# Patient Record
Sex: MALE | Race: WHITE | NOT HISPANIC OR LATINO | URBAN - METROPOLITAN AREA
[De-identification: names, ages, dates, MRNs, and addresses within clinical notes are randomized per-mention and may not be internally consistent; named-entity substitution may affect disease eponyms.]

---

## 2022-03-11 ENCOUNTER — EMERGENCY (EMERGENCY)
Facility: HOSPITAL | Age: 52
LOS: 1 days | Discharge: HOME | End: 2022-03-11
Attending: EMERGENCY MEDICINE
Payer: COMMERCIAL

## 2022-03-11 VITALS
OXYGEN SATURATION: 96 % | SYSTOLIC BLOOD PRESSURE: 164 MMHG | HEART RATE: 93 BPM | RESPIRATION RATE: 18 BRPM | HEIGHT: 74 IN | TEMPERATURE: 99 F | DIASTOLIC BLOOD PRESSURE: 83 MMHG | WEIGHT: 225.09 LBS

## 2022-03-11 PROCEDURE — 99283 EMERGENCY DEPT VISIT LOW MDM: CPT | Mod: 25

## 2022-03-11 PROCEDURE — 73562 X-RAY EXAM OF KNEE 3: CPT | Mod: 26,LT

## 2022-03-11 PROCEDURE — 73562 X-RAY EXAM OF KNEE 3: CPT

## 2022-03-11 PROCEDURE — 99283 EMERGENCY DEPT VISIT LOW MDM: CPT

## 2022-03-11 RX ORDER — ACETAMINOPHEN 500 MG
975 TABLET ORAL ONCE
Refills: 0 | Status: COMPLETED | OUTPATIENT
Start: 2022-03-11 | End: 2022-03-11

## 2022-03-11 RX ORDER — IBUPROFEN 200 MG
600 TABLET ORAL ONCE
Refills: 0 | Status: COMPLETED | OUTPATIENT
Start: 2022-03-11 | End: 2022-03-11

## 2022-03-11 RX ADMIN — Medication 600 MILLIGRAM(S): at 21:27

## 2022-03-11 RX ADMIN — Medication 975 MILLIGRAM(S): at 21:26

## 2022-03-11 NOTE — ED PROVIDER NOTE - PHYSICAL EXAMINATION
NAD, VSS, Afebrile, Lungs clear. ABD soft, non tender. No CVA tender. No sinal or rib tender. No pelvic or hip tender. +Left knee anteriorly tender and mild diminished ROMs without obvious swelling. N/V- intact.

## 2022-03-11 NOTE — ED PROVIDER NOTE - CARE PROVIDER_API CALL
Mathew Mac (MD)  Orthopaedic Surgery  [address]  [place] [address]  Phone: [phone]  Fax: [phone]  Follow Up Time:

## 2022-03-11 NOTE — ED PROVIDER NOTE - PATIENT PORTAL LINK FT
You can access the FollowMyHealth Patient Portal offered by [facility] by registering at the following website: [email or web address]. By joining [facility]’s FollowMyHealth portal, you will also be able to view your health information using other applications (apps) compatible with our system.

## 2022-03-11 NOTE — ED PROVIDER NOTE - ATTENDING CONTRIBUTION TO CARE
I personally saw the patient and performed a substantive portion of the visit including all aspects of the medical decision making.

## 2022-03-11 NOTE — ED PROVIDER NOTE - SCRIBE NAME
Patient notified of providers message as written.  She has visit set up on 8/9/18, confirmed with patient that she will come to this visit and again reminded that no further refills until appointment. Patient verbalized understanding, no further questions or concerns.    Usha Bowers RN    
Please call patient: you have not shown up for your last appointments with me over the last year. I have refilled your bupropion and B12 medications. The rash requires an appointment to treat. No further refills until follow-up appointment. The other medications and supplements are available over-the-counter.     Shania Yeung MD   
Reason for Call:  Medication or medication refill:    Do you use a [facility]?  Name of the pharmacy and phone number for the current request:    [id number]  [place], [place] - [address]  [address]  [place] [address]  Phone: [phone] Fax: [phone]          Name of the medication requested: Bupropion, patient states she is out of all her medications, but the Bupropion is her priority and the most important    Other request: Patient is currently out of the medication and asking for this to be filled right away  Can we leave a detailed message on this number? YES    Phone number patient can be reached at: Home number on file [phone] (home)    Best Time: any time    Call taken on 7/26/2018 at 3:22 PM by Ayesha Fox      
Routing refill request to provider for review/approval because:  Patient needs to be seen because:  Over due for depression f/u  Last visit was on 9/25/18, patient was a No show on 1/23/18 and 3/21/18    Usha Bowers RN          
Sb Ferguson

## 2022-03-11 NOTE — ED PROVIDER NOTE - CLINICAL SUMMARY MEDICAL DECISION MAKING FREE TEXT BOX
João Lopez (MD): L knee injury most consistent with sprain. Plan for xr to rule out fracture, then likely EM with orthopedics follow up. João Lopez (MD): L knee injury most consistent with sprain from inversion injury, possible meniscal injury?. Plan for xr to rule out fracture, if negative knee immobilizer +/- crutches/cane and follow up with [facility] physicians and orthopedics as outpatient.

## 2022-03-11 NOTE — ED PROVIDER NOTE - OBJECTIVE STATEMENT
52 with PMHx/PSHx including WTC asthma on Symbicort x 1 week, HTN, GERD presents to the ED with left knee pain while in the line of duty. Patient was attempting to apprehend an individual. They were on the ground when the individual rolled over the patient's left knee with their full body weight. Endorses chest tightness during incident. Patient was able to ambulate s/p incident, but reports pain to LLE when bearing weight. Patient reports taking Albuterol PTA. Pt is allergic to Penicillin. 52M with PMHx/PSHx including WTC asthma on Symbicort x 1 week, HTN, GERD presents to the ED with left knee pain while in the line of duty. Patient was attempting to apprehend an individual. They were on the ground when the individual rolled over the patient's left knee with their full body weight. Endorses chest tightness during incident. Patient was able to ambulate s/p incident, but reports pain to LLE when bearing weight. Patient reports taking Albuterol PTA. Pt is allergic to Penicillin.

## 2022-03-11 NOTE — ED PROVIDER NOTE - NSFOLLOWUPINSTRUCTIONS_ED_ALL_ED_FT
Elevated the affected knee.    Ice to pain area for 2days; every 2hours for 20minutes.    Take Ibuprofen or Tylenol for pain as package directed and respect warnings on the label.    Keep the knee immobilizer and use the cane for comfort.    Follow up with your orthopedist or Dr. Mac, call Monday for appointment.    Return for any concerns or worsening symptom.    Please see the information of knee pain, knee immobilizer, and cane use.

## 2022-03-11 NOTE — ED ADULT NURSE NOTE - OBJECTIVE STATEMENT
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 Patient was attempting to apprehend an individual. They were on the ground when the individual rolled over the patient's left knee with their full body weight. Endorses chest tightness during incident. Patient was able to ambulate s/p incident, but reports pain to LLE when bearing weight. Patient reports taking Albuterol PTA, which helped his breathing.

## 2022-03-15 NOTE — ED PROVIDER NOTE - DISCHARGE DATE
[FreeTextEntry1] : 53M here in second postoperative visit s/p SMR/ESS (maxillary, ethmoid, sphenoid, frontal) 2/7/22 for nasal obstruction and chronic eosinophilic sinusitis. Intraoperatively, severe left septal deviation and moderate frontoethmoid polypoid edema. He is doing well since last seen. He is using BID budesonide rinses. Congestion is markedly improved. He still feels left sided breathing not as good as right, but otherwise no other complaints. On exam, there are well healing postoperative changes with patent nasal airways and middle meati. He felt better after debridement. There is also dryness and crusting over anterior septum and anterior nasal cavity.\par He is doing well. Continue budesonide rinses, not just daily. To start mupirocin BID to septum for crusting. Advance activity. Hold off on CPAP for another 3-4 weeks. RTO 6 weeks.\par  11-Mar-2022

## 2022-06-01 ENCOUNTER — APPOINTMENT (OUTPATIENT)
Dept: PULMONOLOGY | Facility: CLINIC | Age: 52
End: 2022-06-01
Payer: COMMERCIAL

## 2022-06-01 VITALS
OXYGEN SATURATION: 99 % | SYSTOLIC BLOOD PRESSURE: 153 MMHG | HEIGHT: 74 IN | WEIGHT: 225 LBS | BODY MASS INDEX: 28.88 KG/M2 | HEART RATE: 80 BPM | DIASTOLIC BLOOD PRESSURE: 85 MMHG

## 2022-06-01 PROCEDURE — ZZZZZ: CPT

## 2022-06-01 PROCEDURE — 95012 NITRIC OXIDE EXP GAS DETER: CPT

## 2022-06-01 PROCEDURE — 94010 BREATHING CAPACITY TEST: CPT

## 2022-06-01 PROCEDURE — 99205 OFFICE O/P NEW HI 60 MIN: CPT | Mod: 25

## 2022-06-01 RX ORDER — FLUTICASONE PROPIONATE 50 MCG
50 SPRAY, SUSPENSION NASAL
Refills: 0 | Status: ACTIVE | COMMUNITY

## 2022-06-01 NOTE — PROCEDURE
[FreeTextEntry1] : CT chest dated January 11, 2022 noted for bronchial wall thickening history of old granulomatous disease.  On a prior CT May 11, 2021 a dilated esophagus is noted.\par Image review-essentially normal CT\par \par PFT 2021 appears normal\par \par \par Narrative & Impression\par [facility]\par [address]\par [address]\[place] [address]\par T [phone]\par F [phone]\par Interpretation Report\par Diagnostic Polysomnogram\par Patient: LEOBARDO ROMERO\par YOB: 1970\par MRN: [id number]\par Study Date: 10/7/2021\par Ordering Physician: Toby Villa MD\par Interpreting Physician: Itzel Stovall MD\par Reviewing Fellow: -\par Recording Technologist: Jose Johnson, RPSGT, APSGT\par Indications for Polysomnography: The patient is a 51 year old Male who is 6' 2"\par and weighs 225.0 lbs.  His BMI is\par 28.9.  A full night polysomnogram was performed to evaluate for Obstructive\par sleep apnea (adult) (pediatric) - G47.33\par based on the following indications: history of hypertension, snoring.\par Polysomnogram Data: A full night polysomnogram recorded the standard\par physiologic parameters including EEG,\par EOG, EMG, EKG, nasal and oral airflow.  Respiratory parameters of chest and\par abdominal movements were recorded\par with respiratory inductance plethysmography (RIP).  Oxygen saturation was\par recorded by pulse oximetry.\par Sleep Architecture: Sleep architecture was mildly abnormal. Frequent arousals\par were seen. Few awakenings were\par seen. Sleep latency was 37.1 minutes. The total recording time of the\par polysomnogram was 484.0 minutes.  The total\par sleep time was 412.5 minutes. Sleep Efficiency was 85.2%.  Total wake after\par sleep onset was 34.0 minutes. The\par patient spent 16.0% of total sleep time in Stage N1, 49.2% in Stage N2, 13.7%\par in Stages N3, and 21.1% in REM. -\par REM latency was 108.0 minutes.\par Respiratory Events: A moderate number of respiratory disturbances were seen\par that consisted mostly of apneas.\par hypopneas. RERA's.\par The polysomnogram revealed a presence of 67 obstructive, - central, and 1 mixed\par apneas resulting in an Apnea index\par of 9.9 events per hour.  There were 69 hypopneas (using 4% desaturation\par criteria) resulting in a Hypopnea index of\par 10.0 events per hour.\par The combined Apnea/Hypopnea index (using 4% desaturation criteria for\par Hypopneas) was 19.9 events per hour.\par The RDI was 28.2 events per hour.\par Oximetry showed a moderate number of desaturations that were generally mild in\par magnitude. Baseline oxygen\par saturation was 96.2%.  The lowest oxygen saturation was 81.0%.\par Snoring: Severe snoring was noted.\par Limb Activity: No clinically significant leg movements were seen. There were 54\par limb movements recorded.  Of this\par total, 49 were classified as PLMs.  Of the PLMs, 7 were associated with\par arousals.  The Limb Movement index was 7.9\par per hour while the PLM index was 7.1 per hour.\par Cardiac Summary: PVC's was/were seen. The average pulse rate was 61.9 bpm.  The\par minimum pulse rate was 51.0\par bpm while the maximum pulse rate was 92.0 bpm.\par Impression: The study findings are consistent with obstructive sleep apnea\par (moderate).\par BRADBOUCHRA LEOBARDO 1970 MRN [id number]  Page 1 of 2\par Diagnosis: Obstructive sleep apnea (adult) (pediatric) - G47.33\par This interpretation is electronically signed by Itzel Stovall MD. I attest\par that I have reviewed the raw data of the\par entire recording.\par BRADBOUCHRA LEOBARDO 1970 MRN [id number]  Page 2 of 2\par \par Titration study January 2022 titrated to CPAP 7 nasal mask\par

## 2022-06-01 NOTE — ASSESSMENT
[FreeTextEntry1] : [facility] case with asthma sleep apnea and reflux.  By report CT is abnormal showing evidence of old granulomatous disease but do not appreciate this on review.  His spirometry does show an interval decline compared to prior PFTs he provided and he is more symptomatic than before.  Recommend trial of changing Symbicort to Breztri.  If he remains symptomatic despite this change will consider steroid trial for additional reversibility.  He may be a candidate for Biologics if he remains symptomatic and if allergic asthma can be documented.\par \par I have asked him to provide me with his CPAP machine for data download and compliance review

## 2022-06-01 NOTE — HISTORY OF PRESENT ILLNESS
[Never] : never [TextBox_4] : Referred by [place].  Reports worsening shortness of breath and exercise intolerance symptoms progressing since March 2021.  He had an episode where he ran into a building to deal with a suspect he became quite dyspneic at the same time he fell and injured his knee.  He has asthma reportedly since 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

## 2022-07-06 ENCOUNTER — APPOINTMENT (OUTPATIENT)
Dept: PULMONOLOGY | Facility: CLINIC | Age: 52
End: 2022-07-06

## 2022-07-06 VITALS
WEIGHT: 225 LBS | HEART RATE: 73 BPM | HEIGHT: 74 IN | OXYGEN SATURATION: 97 % | BODY MASS INDEX: 28.88 KG/M2 | SYSTOLIC BLOOD PRESSURE: 146 MMHG | DIASTOLIC BLOOD PRESSURE: 101 MMHG

## 2022-07-06 PROCEDURE — ZZZZZ: CPT

## 2022-07-06 PROCEDURE — 99214 OFFICE O/P EST MOD 30 MIN: CPT | Mod: 25

## 2022-07-06 PROCEDURE — 94726 PLETHYSMOGRAPHY LUNG VOLUMES: CPT

## 2022-07-06 PROCEDURE — 94060 EVALUATION OF WHEEZING: CPT

## 2022-07-06 PROCEDURE — 94729 DIFFUSING CAPACITY: CPT

## 2022-07-06 RX ORDER — BUDESONIDE AND FORMOTEROL FUMARATE DIHYDRATE 160; 4.5 UG/1; UG/1
160-4.5 AEROSOL RESPIRATORY (INHALATION)
Refills: 0 | Status: DISCONTINUED | COMMUNITY
End: 2022-07-06

## 2022-07-06 RX ORDER — BUDESONIDE, GLYCOPYRROLATE, AND FORMOTEROL FUMARATE 160; 9; 4.8 UG/1; UG/1; UG/1
160-9-4.8 AEROSOL, METERED RESPIRATORY (INHALATION)
Qty: 1 | Refills: 5 | Status: ACTIVE | COMMUNITY
Start: 2022-07-06 | End: 1900-01-01

## 2022-07-07 NOTE — PROCEDURE
[FreeTextEntry1] : PFT demonstrates interval improvement\par There is a 270 cc bronchodilator response which is consistent with but not diagnostic of asthma.

## 2022-07-07 NOTE — ASSESSMENT
[FreeTextEntry1] : Patient is currently on treatment with PAP. Data download confirms excellent compliance. Patient continues to use treatment and is benefiting from it.\par \par From asthma perspective doing better with significant interval improvement in PFT however significant ongoing respiratory symptoms.  The fact that he has asthma disqualifies him from his current occupation.  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  The presence of asthma disqualifies him from this activity and he SAVANA will be apprised of this.

## 2022-07-07 NOTE — HISTORY OF PRESENT ILLNESS
[TextBox_4] : Follow-up for asthma shortness of breath and congestion history of [place] exposure.  Currently on Breztri feeling better\par \par Here for KARI followup. Reports no current respiratory symptoms or sleep symptoms. Using PAP on a nightly basis without difficulty. Reports no symptoms of daytime sleepiness. Getting supplies regularly. \par \par Device: Resmed S11 auto\par \par Vendor: [facility]\par \par Setting: CPAP 7\par \par Mask:eson 2 med\par \par Issues: Tolerating CPAP well.\par

## 2022-07-27 ENCOUNTER — APPOINTMENT (OUTPATIENT)
Dept: PULMONOLOGY | Facility: CLINIC | Age: 52
End: 2022-07-27

## 2022-08-09 PROBLEM — J45.909 UNSPECIFIED ASTHMA, UNCOMPLICATED: Chronic | Status: ACTIVE | Noted: 2022-03-11

## 2022-08-10 ENCOUNTER — TRANSCRIPTION ENCOUNTER (OUTPATIENT)
Age: 52
End: 2022-08-10

## 2023-01-18 ENCOUNTER — APPOINTMENT (OUTPATIENT)
Dept: PULMONOLOGY | Facility: CLINIC | Age: 53
End: 2023-01-18
Payer: COMMERCIAL

## 2023-01-18 VITALS
BODY MASS INDEX: 28.88 KG/M2 | SYSTOLIC BLOOD PRESSURE: 151 MMHG | DIASTOLIC BLOOD PRESSURE: 102 MMHG | HEART RATE: 75 BPM | WEIGHT: 225 LBS | OXYGEN SATURATION: 98 % | HEIGHT: 74 IN

## 2023-01-18 PROCEDURE — 94729 DIFFUSING CAPACITY: CPT

## 2023-01-18 PROCEDURE — 94726 PLETHYSMOGRAPHY LUNG VOLUMES: CPT

## 2023-01-18 PROCEDURE — 99214 OFFICE O/P EST MOD 30 MIN: CPT | Mod: 25

## 2023-01-18 PROCEDURE — ZZZZZ: CPT

## 2023-01-18 PROCEDURE — 94060 EVALUATION OF WHEEZING: CPT

## 2023-01-18 NOTE — REASON FOR VISIT
[Asthma] : asthma [Sleep Apnea] : sleep apnea [Shortness of Breath] : shortness of breath [Follow-Up] : a follow-up visit

## 2023-01-18 NOTE — PROCEDURE
[FreeTextEntry1] : PFT without interval change\par \par CPAP data download confirms excellent compliance AHI less than 5

## 2023-01-18 NOTE — HISTORY OF PRESENT ILLNESS
[Never] : never [TextBox_4] : Follow-up for asthma and sleep apnea.  Reports persistent exertional dyspnea had episode of lightheadedness with stair climbing.  Had full cardiac work-up reportedly negative.  Reports ongoing use of CPAP without difficulty.  Getting all supplies

## 2023-01-18 NOTE — ASSESSMENT
[FreeTextEntry1] : Patient is currently on treatment with PAP. Data download confirms excellent compliance. Patient continues to use treatment and is benefiting from it.\par \par Degree of pulmonary impairment appears disproportionate to PFT result.  He is using inhalers both maintenance Breztri and rescue inhaler and despite this reports significant dyspnea.  Assuming there is no cardiac etiology-will give trial of steroid and repeat PFT in 2 weeks

## 2023-01-19 ENCOUNTER — APPOINTMENT (OUTPATIENT)
Dept: PULMONOLOGY | Facility: CLINIC | Age: 53
End: 2023-01-19

## 2023-02-01 ENCOUNTER — APPOINTMENT (OUTPATIENT)
Dept: PULMONOLOGY | Facility: CLINIC | Age: 53
End: 2023-02-01
Payer: COMMERCIAL

## 2023-02-01 PROCEDURE — 94726 PLETHYSMOGRAPHY LUNG VOLUMES: CPT

## 2023-02-01 PROCEDURE — 94729 DIFFUSING CAPACITY: CPT

## 2023-02-01 PROCEDURE — 94010 BREATHING CAPACITY TEST: CPT

## 2023-02-01 PROCEDURE — ZZZZZ: CPT

## 2023-02-09 ENCOUNTER — TRANSCRIPTION ENCOUNTER (OUTPATIENT)
Age: 53
End: 2023-02-09

## 2023-04-19 ENCOUNTER — APPOINTMENT (OUTPATIENT)
Dept: PULMONOLOGY | Facility: CLINIC | Age: 53
End: 2023-04-19
Payer: COMMERCIAL

## 2023-04-19 VITALS
SYSTOLIC BLOOD PRESSURE: 117 MMHG | HEIGHT: 74 IN | BODY MASS INDEX: 28.23 KG/M2 | OXYGEN SATURATION: 98 % | DIASTOLIC BLOOD PRESSURE: 78 MMHG | WEIGHT: 220 LBS | HEART RATE: 63 BPM

## 2023-04-19 DIAGNOSIS — J45.909 UNSPECIFIED ASTHMA, UNCOMPLICATED: ICD-10-CM

## 2023-04-19 PROCEDURE — 99213 OFFICE O/P EST LOW 20 MIN: CPT | Mod: 25

## 2023-04-19 PROCEDURE — 94010 BREATHING CAPACITY TEST: CPT

## 2023-04-19 PROCEDURE — ZZZZZ: CPT

## 2023-04-19 RX ORDER — PREDNISONE 10 MG/1
10 TABLET ORAL
Qty: 30 | Refills: 0 | Status: DISCONTINUED | COMMUNITY
Start: 2023-01-18 | End: 2023-04-19

## 2023-04-19 NOTE — PROCEDURE
[FreeTextEntry1] : Usage days 29/30 days (97%)\par >= 4 hours 26 days (87%)\par < 4 hours 3 days (10%)\par Usage hours 171 hours 55 minutes\par Average usage (total days) 5 hours 44 minutes\par Average usage (days used) 5 hours 56 minutes\par Median usage (days used) 5 hours 55 minutes\par Total used hours (value since last reset - 04/18/2023) 2,293 hours\par AirSense 11 AutoSet\par Serial number [id number]\par Mode CPAP\par Set pressure 7 cmH2O\par EPR Fulltime\par EPR level 3\par Therapy\par Leaks - L/min Median: 0.2 95th percentile: 7.9 Maximum: 15.6\par Events per hour AI: 0.1 HI: 0.1 AHI: 0.2\par Apnea Index Central: 0.0 Obstructive: 0.1 Unknown: 0.0\par RERA Index 0.3\par Cheyne-Holland respiration (average duration per night) 0 minutes

## 2023-04-19 NOTE — HISTORY OF PRESENT ILLNESS
[TextBox_4] : Follow-up for sleep apnea, asthma.  [place] exposure.  No overall change continues to use Breztri inhaler.  Earlier this year he was very symptomatic and I gave him a course of steroids this did not result in any objective change in spirometry.  Uses CPAP on a nightly basis and tolerates it well reports no symptoms of daytime sleepiness

## 2023-10-25 ENCOUNTER — APPOINTMENT (OUTPATIENT)
Dept: PULMONOLOGY | Facility: CLINIC | Age: 53
End: 2023-10-25
Payer: COMMERCIAL

## 2023-10-25 VITALS
DIASTOLIC BLOOD PRESSURE: 81 MMHG | OXYGEN SATURATION: 98 % | HEART RATE: 66 BPM | WEIGHT: 220 LBS | SYSTOLIC BLOOD PRESSURE: 127 MMHG | HEIGHT: 74 IN | BODY MASS INDEX: 28.23 KG/M2

## 2023-10-25 PROCEDURE — 99213 OFFICE O/P EST LOW 20 MIN: CPT | Mod: 25

## 2023-10-25 PROCEDURE — 94727 GAS DIL/WSHOT DETER LNG VOL: CPT

## 2023-10-25 PROCEDURE — 94729 DIFFUSING CAPACITY: CPT

## 2023-10-25 PROCEDURE — 94010 BREATHING CAPACITY TEST: CPT

## 2023-10-25 PROCEDURE — ZZZZZ: CPT

## 2023-10-25 PROCEDURE — 71046 X-RAY EXAM CHEST 2 VIEWS: CPT

## 2023-12-22 NOTE — ED ADULT NURSE NOTE - FINAL NURSING ELECTRONIC SIGNATURE
CM notified about emergent discharge and transfer to [facility] via [facility]. CM contacted transfer center and coordinated with Chhaya Loja RN to get patient transferred.
11-Mar-2022 22:02

## 2024-01-23 RX ORDER — ROSUVASTATIN CALCIUM 20 MG/1
20 TABLET, FILM COATED ORAL
Qty: 90 | Refills: 1 | Status: ACTIVE | COMMUNITY
Start: 2024-01-23

## 2024-01-23 RX ORDER — RAMIPRIL 10 MG/1
10 CAPSULE ORAL DAILY
Refills: 0 | Status: ACTIVE | COMMUNITY
Start: 2024-01-23

## 2024-02-26 ENCOUNTER — APPOINTMENT (OUTPATIENT)
Dept: INTERNAL MEDICINE | Facility: CLINIC | Age: 54
End: 2024-02-26

## 2024-04-08 RX ORDER — ROSUVASTATIN CALCIUM 20 MG/1
20 TABLET, FILM COATED ORAL
Qty: 30 | Refills: 0 | Status: COMPLETED | COMMUNITY
Start: 2022-04-21 | End: 2024-04-08

## 2024-04-08 RX ORDER — RAMIPRIL 10 MG/1
10 CAPSULE ORAL
Qty: 30 | Refills: 0 | Status: COMPLETED | COMMUNITY
Start: 2022-04-21 | End: 2024-04-08

## 2024-04-09 ENCOUNTER — APPOINTMENT (OUTPATIENT)
Dept: INTERNAL MEDICINE | Facility: CLINIC | Age: 54
End: 2024-04-09
Payer: COMMERCIAL

## 2024-04-09 VITALS — DIASTOLIC BLOOD PRESSURE: 80 MMHG | SYSTOLIC BLOOD PRESSURE: 126 MMHG

## 2024-04-09 VITALS
SYSTOLIC BLOOD PRESSURE: 144 MMHG | HEIGHT: 74 IN | RESPIRATION RATE: 16 BRPM | HEART RATE: 68 BPM | BODY MASS INDEX: 28.23 KG/M2 | TEMPERATURE: 97.6 F | DIASTOLIC BLOOD PRESSURE: 100 MMHG | OXYGEN SATURATION: 98 % | WEIGHT: 220 LBS

## 2024-04-09 DIAGNOSIS — I10 ESSENTIAL (PRIMARY) HYPERTENSION: ICD-10-CM

## 2024-04-09 DIAGNOSIS — K21.9 GASTRO-ESOPHAGEAL REFLUX DISEASE W/OUT ESOPHAGITIS: ICD-10-CM

## 2024-04-09 DIAGNOSIS — Z00.00 ENCOUNTER FOR GENERAL ADULT MEDICAL EXAMINATION W/OUT ABNORMAL FINDINGS: ICD-10-CM

## 2024-04-09 DIAGNOSIS — Z78.9 OTHER SPECIFIED HEALTH STATUS: ICD-10-CM

## 2024-04-09 DIAGNOSIS — Z85.828 PERSONAL HISTORY OF OTHER MALIGNANT NEOPLASM OF SKIN: ICD-10-CM

## 2024-04-09 DIAGNOSIS — G47.33 OBSTRUCTIVE SLEEP APNEA (ADULT) (PEDIATRIC): ICD-10-CM

## 2024-04-09 DIAGNOSIS — F41.9 ANXIETY DISORDER, UNSPECIFIED: ICD-10-CM

## 2024-04-09 DIAGNOSIS — J44.89 OTHER SPECIFIED CHRONIC OBSTRUCTIVE PULMONARY DISEASE: ICD-10-CM

## 2024-04-09 DIAGNOSIS — E66.3 OVERWEIGHT: ICD-10-CM

## 2024-04-09 DIAGNOSIS — E78.00 PURE HYPERCHOLESTEROLEMIA, UNSPECIFIED: ICD-10-CM

## 2024-04-09 PROCEDURE — 99386 PREV VISIT NEW AGE 40-64: CPT | Mod: 25

## 2024-04-09 PROCEDURE — 36415 COLL VENOUS BLD VENIPUNCTURE: CPT

## 2024-04-09 RX ORDER — FAMOTIDINE 20 MG/1
20 TABLET, FILM COATED ORAL
Qty: 90 | Refills: 3 | Status: ACTIVE | COMMUNITY

## 2024-04-09 RX ORDER — MONTELUKAST 10 MG/1
10 TABLET, FILM COATED ORAL
Qty: 90 | Refills: 1 | Status: ACTIVE | COMMUNITY
Start: 2021-11-22

## 2024-04-09 RX ORDER — TIOTROPIUM BROMIDE INHALATION SPRAY 3.12 UG/1
2.5 SPRAY, METERED RESPIRATORY (INHALATION) DAILY
Refills: 0 | Status: ACTIVE | COMMUNITY

## 2024-04-09 RX ORDER — BUDESONIDE AND FORMOTEROL FUMARATE DIHYDRATE 160; 4.5 UG/1; UG/1
160-4.5 AEROSOL RESPIRATORY (INHALATION)
Refills: 0 | Status: ACTIVE | COMMUNITY

## 2024-04-09 RX ORDER — PANTOPRAZOLE 40 MG/1
40 TABLET, DELAYED RELEASE ORAL
Qty: 90 | Refills: 1 | Status: ACTIVE | COMMUNITY

## 2024-04-09 RX ORDER — BUSPIRONE HYDROCHLORIDE 5 MG/1
5 TABLET ORAL 3 TIMES DAILY
Qty: 90 | Refills: 0 | Status: ACTIVE | COMMUNITY

## 2024-04-09 RX ORDER — ALBUTEROL SULFATE 90 UG/1
108 (90 BASE) INHALANT RESPIRATORY (INHALATION)
Qty: 1 | Refills: 3 | Status: ACTIVE | COMMUNITY

## 2024-04-09 NOTE — HEALTH RISK ASSESSMENT
[Excellent] : ~his/her~  mood as  excellent [No] : In the past 12 months have you used drugs other than those required for medical reasons? No [0] : 2) Feeling down, depressed, or hopeless: Not at all (0) [PHQ-2 Negative - No further assessment needed] : PHQ-2 Negative - No further assessment needed [Sexually Active] : sexually active [Smoke Detector] : smoke detector [Carbon Monoxide Detector] : carbon monoxide detector [Seat Belt] :  uses seat belt [Sunscreen] : uses sunscreen [Never] : Never [No falls in past year] : Patient reported no falls in the past year [Patient reported colonoscopy was normal] : Patient reported colonoscopy was normal [None] : None [With Family] : lives with family [Retired] : retired [College] : College [de-identified] : cardiology, GI, pulmonary, urology [[id number]] : 0 [Change in mental status noted] : No change in mental status noted [High Risk Behavior] : no high risk behavior [Reports changes in hearing] : Reports no changes in hearing [Reports changes in vision] : Reports no changes in vision [Reports changes in dental health] : Reports no changes in dental health [ColonoscopyDate] : 07/21 [ColonoscopyComments] : GI - Dr. Dell Singh

## 2024-04-09 NOTE — PHYSICAL EXAM
[No Edema] : there was no peripheral edema [No Rash] : no rash [Normal Gait] : normal gait [Normal Affect] : the affect was normal [Normal Mood] : the mood was normal [Normal] : the outer ears and nose were normal in appearance and the oropharynx was normal [No Lymphadenopathy] : no lymphadenopathy [Supple] : supple [Normal Posterior Cervical Nodes] : no posterior cervical lymphadenopathy [Normal Anterior Cervical Nodes] : no anterior cervical lymphadenopathy [No CVA Tenderness] : no CVA  tenderness [No Spinal Tenderness] : no spinal tenderness [No Joint Swelling] : no joint swelling [Grossly Normal Strength/Tone] : grossly normal strength/tone [Coordination Grossly Intact] : coordination grossly intact [Deep Tendon Reflexes (DTR)] : deep tendon reflexes were 2+ and symmetric [Alert and Oriented x3] : oriented to person, place, and time [de-identified] : friendly

## 2024-04-09 NOTE — PLAN
Last appt 1/11/18  Next appt 7/16/18    Refill request for   Disp Refills Start End    nitroGLYcerin (NITROSTAT) 0.4 MG SL tablet 30 tablet 2 1/17/2017     Sig - Route: Place 1 tablet under the tongue every 5 minutes as needed for Chest pain        Refill eprescribed per refill protocol.   [FreeTextEntry1] : Check routine fasting labs. Discussed diet, exercise, and weight maintenance. HCM UTD. Vaccines reviewed. Prostate cancer screening with PSA. HTN - BP controlled on Ramipril. HLD - check fasting lipids, continue Crestor. COPD / asthma - continue inhalers. Followed with pulmonary. GERD - continue PPI / Famotidine. Followed with GI. Anxiety - continue Buspirone.  RTO in 1 yr for annual exam or earlier PRN for acute care.

## 2024-04-09 NOTE — HISTORY OF PRESENT ILLNESS
[FreeTextEntry1] : New pt [de-identified] : 54 year old M comes to establish medical care and an annual exam. Previously followed by Dr. Martin. Last physical in 2/23. He also follows at [facility] health program at [place], next annual in 5/24.  Medical history and medications reviewed with patient.  He feels well and has no complaints.

## 2024-04-14 ENCOUNTER — TRANSCRIPTION ENCOUNTER (OUTPATIENT)
Age: 54
End: 2024-04-14

## 2024-04-14 LAB
25(OH)D3 SERPL-MCNC: 29.5 NG/ML
ALBUMIN SERPL ELPH-MCNC: 5 G/DL
ALP BLD-CCNC: 81 U/L
ALT SERPL-CCNC: 31 U/L
ANION GAP SERPL CALC-SCNC: 14 MMOL/L
APPEARANCE: CLEAR
AST SERPL-CCNC: 30 U/L
BACTERIA: NEGATIVE /HPF
BASOPHILS # BLD AUTO: 0.06 K/UL
BASOPHILS NFR BLD AUTO: 0.8 %
BILIRUB SERPL-MCNC: 0.6 MG/DL
BILIRUBIN URINE: NEGATIVE
BLOOD URINE: NEGATIVE
BUN SERPL-MCNC: 14 MG/DL
CALCIUM SERPL-MCNC: 10 MG/DL
CAST: 0 /LPF
CHLORIDE SERPL-SCNC: 103 MMOL/L
CHOLEST SERPL-MCNC: 183 MG/DL
CO2 SERPL-SCNC: 23 MMOL/L
COLOR: YELLOW
CREAT SERPL-MCNC: 1.07 MG/DL
EGFR: 84 ML/MIN/1.73M2
EOSINOPHIL # BLD AUTO: 0.14 K/UL
EOSINOPHIL NFR BLD AUTO: 1.8 %
EPITHELIAL CELLS: 0 /HPF
ESTIMATED AVERAGE GLUCOSE: 100 MG/DL
GLUCOSE QUALITATIVE U: NEGATIVE MG/DL
GLUCOSE SERPL-MCNC: 75 MG/DL
HBA1C MFR BLD HPLC: 5.1 %
HCT VFR BLD CALC: 47.5 %
HDLC SERPL-MCNC: 42 MG/DL
HGB BLD-MCNC: 16.6 G/DL
IMM GRANULOCYTES NFR BLD AUTO: 0.3 %
KETONES URINE: NEGATIVE MG/DL
LDLC SERPL CALC-MCNC: 95 MG/DL
LEUKOCYTE ESTERASE URINE: NEGATIVE
LYMPHOCYTES # BLD AUTO: 2.28 K/UL
LYMPHOCYTES NFR BLD AUTO: 29.8 %
MAN DIFF?: NORMAL
MCHC RBC-ENTMCNC: 29.6 PG
MCHC RBC-ENTMCNC: 34.9 GM/DL
MCV RBC AUTO: 84.8 FL
MICROSCOPIC-UA: NORMAL
MONOCYTES # BLD AUTO: 0.51 K/UL
MONOCYTES NFR BLD AUTO: 6.7 %
NEUTROPHILS # BLD AUTO: 4.63 K/UL
NEUTROPHILS NFR BLD AUTO: 60.6 %
NITRITE URINE: NEGATIVE
NONHDLC SERPL-MCNC: 141 MG/DL
PH URINE: 6
PLATELET # BLD AUTO: 247 K/UL
POTASSIUM SERPL-SCNC: 4.4 MMOL/L
PROT SERPL-MCNC: 7.9 G/DL
PROTEIN URINE: NEGATIVE MG/DL
PSA SERPL-MCNC: 0.18 NG/ML
RBC # BLD: 5.6 M/UL
RBC # FLD: 14.1 %
RED BLOOD CELLS URINE: 0 /HPF
SODIUM SERPL-SCNC: 140 MMOL/L
SPECIFIC GRAVITY URINE: 1.01
TRIGL SERPL-MCNC: 271 MG/DL
TSH SERPL-ACNC: 1.18 UIU/ML
UROBILINOGEN URINE: 0.2 MG/DL
VIT B12 SERPL-MCNC: 739 PG/ML
WBC # FLD AUTO: 7.64 K/UL
WHITE BLOOD CELLS URINE: 0 /HPF

## 2024-09-03 ENCOUNTER — NON-APPOINTMENT (OUTPATIENT)
Age: 54
End: 2024-09-03

## 2024-09-03 DIAGNOSIS — F43.10 POST-TRAUMATIC STRESS DISORDER, UNSPECIFIED: ICD-10-CM

## 2024-09-03 DIAGNOSIS — Z77.9 OTHER CONTACT WITH AND (SUSPECTED) EXPOSURES HAZARDOUS TO HEALTH: ICD-10-CM

## 2024-09-03 DIAGNOSIS — Z80.1 FAMILY HISTORY OF MALIGNANT NEOPLASM OF TRACHEA, BRONCHUS AND LUNG: ICD-10-CM

## 2024-11-11 ENCOUNTER — LABORATORY RESULT (OUTPATIENT)
Age: 54
End: 2024-11-11

## 2024-12-04 ENCOUNTER — APPOINTMENT (OUTPATIENT)
Facility: CLINIC | Age: 54
End: 2024-12-04

## 2024-12-04 ENCOUNTER — APPOINTMENT (OUTPATIENT)
Facility: CLINIC | Age: 54
End: 2024-12-04
Payer: COMMERCIAL

## 2024-12-04 VITALS
RESPIRATION RATE: 17 BRPM | HEART RATE: 75 BPM | SYSTOLIC BLOOD PRESSURE: 142 MMHG | HEIGHT: 74 IN | OXYGEN SATURATION: 97 % | TEMPERATURE: 98 F | DIASTOLIC BLOOD PRESSURE: 89 MMHG | BODY MASS INDEX: 28.23 KG/M2 | WEIGHT: 220 LBS

## 2024-12-04 DIAGNOSIS — J44.89 OTHER SPECIFIED CHRONIC OBSTRUCTIVE PULMONARY DISEASE: ICD-10-CM

## 2024-12-04 DIAGNOSIS — J44.1 CHRONIC OBSTRUCTIVE PULMONARY DISEASE WITH (ACUTE) EXACERBATION: ICD-10-CM

## 2024-12-04 DIAGNOSIS — J45.901 UNSPECIFIED ASTHMA WITH (ACUTE) EXACERBATION: ICD-10-CM

## 2024-12-04 DIAGNOSIS — E78.1 PURE HYPERGLYCERIDEMIA: ICD-10-CM

## 2024-12-04 DIAGNOSIS — J45.909 UNSPECIFIED ASTHMA, UNCOMPLICATED: ICD-10-CM

## 2024-12-04 DIAGNOSIS — Z77.9 OTHER CONTACT WITH AND (SUSPECTED) EXPOSURES HAZARDOUS TO HEALTH: ICD-10-CM

## 2024-12-04 DIAGNOSIS — G47.33 OBSTRUCTIVE SLEEP APNEA (ADULT) (PEDIATRIC): ICD-10-CM

## 2024-12-04 PROCEDURE — 99214 OFFICE O/P EST MOD 30 MIN: CPT

## 2024-12-04 RX ORDER — PREDNISONE 10 MG/1
10 TABLET ORAL
Qty: 10 | Refills: 0 | Status: ACTIVE | COMMUNITY
Start: 2024-12-04

## 2024-12-04 RX ORDER — FLUTICASONE FUROATE, UMECLIDINIUM BROMIDE AND VILANTEROL TRIFENATATE 200; 62.5; 25 UG/1; UG/1; UG/1
200-62.5-25 POWDER RESPIRATORY (INHALATION)
Qty: 1 | Refills: 5 | Status: ACTIVE | COMMUNITY
Start: 2024-12-04

## 2025-02-25 ENCOUNTER — APPOINTMENT (OUTPATIENT)
Facility: CLINIC | Age: 55
End: 2025-02-25
Payer: COMMERCIAL

## 2025-02-25 VITALS
RESPIRATION RATE: 17 BRPM | BODY MASS INDEX: 28.88 KG/M2 | HEIGHT: 74 IN | HEART RATE: 70 BPM | SYSTOLIC BLOOD PRESSURE: 127 MMHG | DIASTOLIC BLOOD PRESSURE: 83 MMHG | TEMPERATURE: 97.9 F | WEIGHT: 225 LBS | OXYGEN SATURATION: 98 %

## 2025-02-25 DIAGNOSIS — J30.9 ALLERGIC RHINITIS, UNSPECIFIED: ICD-10-CM

## 2025-02-25 DIAGNOSIS — J44.89 OTHER SPECIFIED CHRONIC OBSTRUCTIVE PULMONARY DISEASE: ICD-10-CM

## 2025-02-25 DIAGNOSIS — J45.41 MODERATE PERSISTENT ASTHMA WITH (ACUTE) EXACERBATION: ICD-10-CM

## 2025-02-25 DIAGNOSIS — Z78.9 OTHER SPECIFIED HEALTH STATUS: ICD-10-CM

## 2025-02-25 DIAGNOSIS — J44.1 CHRONIC OBSTRUCTIVE PULMONARY DISEASE WITH (ACUTE) EXACERBATION: ICD-10-CM

## 2025-02-25 DIAGNOSIS — J45.901 UNSPECIFIED ASTHMA WITH (ACUTE) EXACERBATION: ICD-10-CM

## 2025-02-25 DIAGNOSIS — Z77.9 OTHER CONTACT WITH AND (SUSPECTED) EXPOSURES HAZARDOUS TO HEALTH: ICD-10-CM

## 2025-02-25 DIAGNOSIS — E66.3 OVERWEIGHT: ICD-10-CM

## 2025-02-25 DIAGNOSIS — F43.10 POST-TRAUMATIC STRESS DISORDER, UNSPECIFIED: ICD-10-CM

## 2025-02-25 PROCEDURE — 99214 OFFICE O/P EST MOD 30 MIN: CPT | Mod: 25

## 2025-02-25 PROCEDURE — 94060 EVALUATION OF WHEEZING: CPT

## 2025-02-25 PROCEDURE — 94729 DIFFUSING CAPACITY: CPT

## 2025-02-25 PROCEDURE — 94727 GAS DIL/WSHOT DETER LNG VOL: CPT

## 2025-02-25 PROCEDURE — ZZZZZ: CPT

## 2025-02-25 RX ORDER — ALBUTEROL SULFATE AND BUDESONIDE 90; 80 UG/1; UG/1
90-80 AEROSOL, METERED RESPIRATORY (INHALATION) EVERY 4 HOURS
Qty: 3 | Refills: 3 | Status: ACTIVE | COMMUNITY
Start: 2025-02-25 | End: 1900-01-01

## 2025-02-25 RX ORDER — AZELASTINE HYDROCHLORIDE 137 UG/1
137 SPRAY, METERED NASAL TWICE DAILY
Qty: 3 | Refills: 3 | Status: ACTIVE | COMMUNITY
Start: 2025-02-25 | End: 1900-01-01

## 2025-02-26 PROBLEM — F43.10 PTSD (POST-TRAUMATIC STRESS DISORDER): Status: ACTIVE | Noted: 2025-02-26

## 2025-02-26 PROBLEM — J30.9 CHRONIC ALLERGIC RHINITIS: Status: ACTIVE | Noted: 2025-02-26

## 2025-02-26 PROBLEM — Z78.9 MODERATE EXERCISE: Status: ACTIVE | Noted: 2025-02-26

## 2025-05-29 ENCOUNTER — APPOINTMENT (OUTPATIENT)
Dept: INTERNAL MEDICINE | Facility: CLINIC | Age: 55
End: 2025-05-29
Payer: COMMERCIAL

## 2025-05-29 VITALS
SYSTOLIC BLOOD PRESSURE: 100 MMHG | TEMPERATURE: 98 F | DIASTOLIC BLOOD PRESSURE: 78 MMHG | BODY MASS INDEX: 29.52 KG/M2 | HEART RATE: 69 BPM | OXYGEN SATURATION: 98 % | WEIGHT: 230 LBS | HEIGHT: 74 IN

## 2025-05-29 DIAGNOSIS — Z00.00 ENCOUNTER FOR GENERAL ADULT MEDICAL EXAMINATION W/OUT ABNORMAL FINDINGS: ICD-10-CM

## 2025-05-29 DIAGNOSIS — I10 ESSENTIAL (PRIMARY) HYPERTENSION: ICD-10-CM

## 2025-05-29 DIAGNOSIS — K21.9 GASTRO-ESOPHAGEAL REFLUX DISEASE W/OUT ESOPHAGITIS: ICD-10-CM

## 2025-05-29 DIAGNOSIS — E55.9 VITAMIN D DEFICIENCY, UNSPECIFIED: ICD-10-CM

## 2025-05-29 DIAGNOSIS — J44.89 OTHER SPECIFIED CHRONIC OBSTRUCTIVE PULMONARY DISEASE: ICD-10-CM

## 2025-05-29 DIAGNOSIS — E78.1 PURE HYPERGLYCERIDEMIA: ICD-10-CM

## 2025-05-29 DIAGNOSIS — E78.00 PURE HYPERCHOLESTEROLEMIA, UNSPECIFIED: ICD-10-CM

## 2025-05-29 PROCEDURE — 36415 COLL VENOUS BLD VENIPUNCTURE: CPT

## 2025-05-29 PROCEDURE — 99396 PREV VISIT EST AGE 40-64: CPT

## 2025-05-29 RX ORDER — FENOFIBRATE 145 MG/1
145 TABLET, COATED ORAL
Qty: 90 | Refills: 3 | Status: ACTIVE | COMMUNITY

## 2025-05-30 ENCOUNTER — TRANSCRIPTION ENCOUNTER (OUTPATIENT)
Age: 55
End: 2025-05-30

## 2025-05-30 LAB
25(OH)D3 SERPL-MCNC: 32.6 NG/ML
ALBUMIN SERPL ELPH-MCNC: 5 G/DL
ALP BLD-CCNC: 49 U/L
ALT SERPL-CCNC: 43 U/L
ANION GAP SERPL CALC-SCNC: 13 MMOL/L
APPEARANCE: CLEAR
AST SERPL-CCNC: 42 U/L
BACTERIA: NEGATIVE /HPF
BASOPHILS # BLD AUTO: 0.05 K/UL
BASOPHILS NFR BLD AUTO: 0.6 %
BILIRUB SERPL-MCNC: 0.5 MG/DL
BILIRUBIN URINE: NEGATIVE
BLOOD URINE: NEGATIVE
BUN SERPL-MCNC: 25 MG/DL
CALCIUM SERPL-MCNC: 10.2 MG/DL
CAST: 0 /LPF
CHLORIDE SERPL-SCNC: 105 MMOL/L
CHOLEST SERPL-MCNC: 123 MG/DL
CO2 SERPL-SCNC: 21 MMOL/L
COLOR: YELLOW
CREAT SERPL-MCNC: 1.34 MG/DL
EGFRCR SERPLBLD CKD-EPI 2021: 63 ML/MIN/1.73M2
EOSINOPHIL # BLD AUTO: 0.13 K/UL
EOSINOPHIL NFR BLD AUTO: 1.5 %
EPITHELIAL CELLS: 0 /HPF
ESTIMATED AVERAGE GLUCOSE: 100 MG/DL
GLUCOSE QUALITATIVE U: NEGATIVE MG/DL
GLUCOSE SERPL-MCNC: 85 MG/DL
HBA1C MFR BLD HPLC: 5.1 %
HCT VFR BLD CALC: 51.2 %
HDLC SERPL-MCNC: 47 MG/DL
HGB BLD-MCNC: 16.6 G/DL
IMM GRANULOCYTES NFR BLD AUTO: 0.2 %
KETONES URINE: NEGATIVE MG/DL
LDLC SERPL-MCNC: 61 MG/DL
LEUKOCYTE ESTERASE URINE: NEGATIVE
LYMPHOCYTES # BLD AUTO: 1.96 K/UL
LYMPHOCYTES NFR BLD AUTO: 23 %
MAN DIFF?: NORMAL
MCHC RBC-ENTMCNC: 29.7 PG
MCHC RBC-ENTMCNC: 32.4 G/DL
MCV RBC AUTO: 91.8 FL
MICROSCOPIC-UA: NORMAL
MONOCYTES # BLD AUTO: 0.63 K/UL
MONOCYTES NFR BLD AUTO: 7.4 %
NEUTROPHILS # BLD AUTO: 5.73 K/UL
NEUTROPHILS NFR BLD AUTO: 67.3 %
NITRITE URINE: NEGATIVE
NONHDLC SERPL-MCNC: 75 MG/DL
PH URINE: 5
PLATELET # BLD AUTO: 245 K/UL
POTASSIUM SERPL-SCNC: 5.2 MMOL/L
PROT SERPL-MCNC: 7.7 G/DL
PROTEIN URINE: NEGATIVE MG/DL
PSA SERPL-MCNC: 0.17 NG/ML
RBC # BLD: 5.58 M/UL
RBC # FLD: 13.4 %
RED BLOOD CELLS URINE: 0 /HPF
SODIUM SERPL-SCNC: 139 MMOL/L
SPECIFIC GRAVITY URINE: 1.03
TRIGL SERPL-MCNC: 69 MG/DL
TSH SERPL-ACNC: 0.95 UIU/ML
UROBILINOGEN URINE: 0.2 MG/DL
VIT B12 SERPL-MCNC: 547 PG/ML
WBC # FLD AUTO: 8.52 K/UL
WHITE BLOOD CELLS URINE: 0 /HPF

## 2025-09-10 ENCOUNTER — APPOINTMENT (OUTPATIENT)
Facility: CLINIC | Age: 55
End: 2025-09-10
Payer: COMMERCIAL

## 2025-09-10 VITALS
BODY MASS INDEX: 28.23 KG/M2 | RESPIRATION RATE: 16 BRPM | WEIGHT: 220 LBS | DIASTOLIC BLOOD PRESSURE: 88 MMHG | SYSTOLIC BLOOD PRESSURE: 140 MMHG | HEART RATE: 73 BPM | TEMPERATURE: 97.9 F | HEIGHT: 74 IN | OXYGEN SATURATION: 96 %

## 2025-09-10 DIAGNOSIS — J45.901 UNSPECIFIED ASTHMA WITH (ACUTE) EXACERBATION: ICD-10-CM

## 2025-09-10 DIAGNOSIS — J44.89 OTHER SPECIFIED CHRONIC OBSTRUCTIVE PULMONARY DISEASE: ICD-10-CM

## 2025-09-10 DIAGNOSIS — Z80.1 FAMILY HISTORY OF MALIGNANT NEOPLASM OF TRACHEA, BRONCHUS AND LUNG: ICD-10-CM

## 2025-09-10 DIAGNOSIS — E66.3 OVERWEIGHT: ICD-10-CM

## 2025-09-10 DIAGNOSIS — Z77.9 OTHER CONTACT WITH AND (SUSPECTED) EXPOSURES HAZARDOUS TO HEALTH: ICD-10-CM

## 2025-09-10 DIAGNOSIS — J30.9 ALLERGIC RHINITIS, UNSPECIFIED: ICD-10-CM

## 2025-09-10 DIAGNOSIS — J44.1 CHRONIC OBSTRUCTIVE PULMONARY DISEASE WITH (ACUTE) EXACERBATION: ICD-10-CM

## 2025-09-10 DIAGNOSIS — G47.33 OBSTRUCTIVE SLEEP APNEA (ADULT) (PEDIATRIC): ICD-10-CM

## 2025-09-10 DIAGNOSIS — F43.10 POST-TRAUMATIC STRESS DISORDER, UNSPECIFIED: ICD-10-CM

## 2025-09-10 DIAGNOSIS — L71.9 ROSACEA, UNSPECIFIED: ICD-10-CM

## 2025-09-10 DIAGNOSIS — J45.41 MODERATE PERSISTENT ASTHMA WITH (ACUTE) EXACERBATION: ICD-10-CM

## 2025-09-10 PROCEDURE — 99214 OFFICE O/P EST MOD 30 MIN: CPT | Mod: 25

## 2025-09-10 PROCEDURE — ZZZZZ: CPT

## 2025-09-10 PROCEDURE — 94060 EVALUATION OF WHEEZING: CPT

## 2025-09-10 PROCEDURE — 94727 GAS DIL/WSHOT DETER LNG VOL: CPT

## 2025-09-10 PROCEDURE — 94729 DIFFUSING CAPACITY: CPT

## 2025-09-10 RX ORDER — ALBUTEROL SULFATE AND BUDESONIDE 90; 80 UG/1; UG/1
90-80 AEROSOL, METERED RESPIRATORY (INHALATION)
Qty: 3 | Refills: 3 | Status: ACTIVE | COMMUNITY
Start: 2025-09-10 | End: 1900-01-01

## 2025-09-10 RX ORDER — IVERMECTIN 10 MG/G
1 CREAM TOPICAL
Refills: 0 | Status: ACTIVE | COMMUNITY